# Patient Record
Sex: FEMALE | Race: OTHER | NOT HISPANIC OR LATINO | URBAN - METROPOLITAN AREA
[De-identification: names, ages, dates, MRNs, and addresses within clinical notes are randomized per-mention and may not be internally consistent; named-entity substitution may affect disease eponyms.]

---

## 2019-10-27 ENCOUNTER — EMERGENCY (EMERGENCY)
Facility: HOSPITAL | Age: 34
LOS: 1 days | Discharge: HOME | End: 2019-10-27
Attending: EMERGENCY MEDICINE
Payer: COMMERCIAL

## 2019-10-27 VITALS
WEIGHT: 179.9 LBS | RESPIRATION RATE: 22 BRPM | HEART RATE: 107 BPM | TEMPERATURE: 99 F | OXYGEN SATURATION: 97 % | DIASTOLIC BLOOD PRESSURE: 89 MMHG | SYSTOLIC BLOOD PRESSURE: 133 MMHG | HEIGHT: 65 IN

## 2019-10-27 DIAGNOSIS — Z98.89 OTHER SPECIFIED POSTPROCEDURAL STATES: Chronic | ICD-10-CM

## 2019-10-27 LAB
ALBUMIN SERPL ELPH-MCNC: 4.6 G/DL — SIGNIFICANT CHANGE UP (ref 3.3–5)
ALP SERPL-CCNC: 52 U/L — SIGNIFICANT CHANGE UP (ref 40–120)
ALT FLD-CCNC: 12 U/L — SIGNIFICANT CHANGE UP (ref 10–45)
ANION GAP SERPL CALC-SCNC: 15 MMOL/L — SIGNIFICANT CHANGE UP (ref 5–17)
APAP SERPL-MCNC: <15 UG/ML — SIGNIFICANT CHANGE UP (ref 10–30)
AST SERPL-CCNC: 17 U/L — SIGNIFICANT CHANGE UP (ref 10–40)
BASOPHILS # BLD AUTO: 0.03 K/UL — SIGNIFICANT CHANGE UP (ref 0–0.2)
BASOPHILS NFR BLD AUTO: 0.4 % — SIGNIFICANT CHANGE UP (ref 0–2)
BILIRUB SERPL-MCNC: 0.3 MG/DL — SIGNIFICANT CHANGE UP (ref 0.2–1.2)
BUN SERPL-MCNC: 16 MG/DL — SIGNIFICANT CHANGE UP (ref 7–23)
CALCIUM SERPL-MCNC: 9.6 MG/DL — SIGNIFICANT CHANGE UP (ref 8.4–10.5)
CHLORIDE SERPL-SCNC: 106 MMOL/L — SIGNIFICANT CHANGE UP (ref 96–108)
CK SERPL-CCNC: 118 U/L — SIGNIFICANT CHANGE UP (ref 25–170)
CO2 SERPL-SCNC: 20 MMOL/L — LOW (ref 22–31)
CREAT SERPL-MCNC: 1.05 MG/DL — SIGNIFICANT CHANGE UP (ref 0.5–1.3)
EOSINOPHIL # BLD AUTO: 0.06 K/UL — SIGNIFICANT CHANGE UP (ref 0–0.5)
EOSINOPHIL NFR BLD AUTO: 0.8 % — SIGNIFICANT CHANGE UP (ref 0–6)
ETHANOL SERPL-MCNC: SIGNIFICANT CHANGE UP MG/DL (ref 0–10)
GLUCOSE SERPL-MCNC: 123 MG/DL — HIGH (ref 70–99)
HCT VFR BLD CALC: 35 % — SIGNIFICANT CHANGE UP (ref 34.5–45)
HGB BLD-MCNC: 11.9 G/DL — SIGNIFICANT CHANGE UP (ref 11.5–15.5)
IMM GRANULOCYTES NFR BLD AUTO: 0.3 % — SIGNIFICANT CHANGE UP (ref 0–1.5)
LYMPHOCYTES # BLD AUTO: 2.07 K/UL — SIGNIFICANT CHANGE UP (ref 1–3.3)
LYMPHOCYTES # BLD AUTO: 28.8 % — SIGNIFICANT CHANGE UP (ref 13–44)
MCHC RBC-ENTMCNC: 28.5 PG — SIGNIFICANT CHANGE UP (ref 27–34)
MCHC RBC-ENTMCNC: 34 GM/DL — SIGNIFICANT CHANGE UP (ref 32–36)
MCV RBC AUTO: 83.7 FL — SIGNIFICANT CHANGE UP (ref 80–100)
MONOCYTES # BLD AUTO: 0.28 K/UL — SIGNIFICANT CHANGE UP (ref 0–0.9)
MONOCYTES NFR BLD AUTO: 3.9 % — SIGNIFICANT CHANGE UP (ref 2–14)
NEUTROPHILS # BLD AUTO: 4.72 K/UL — SIGNIFICANT CHANGE UP (ref 1.8–7.4)
NEUTROPHILS NFR BLD AUTO: 65.8 % — SIGNIFICANT CHANGE UP (ref 43–77)
NRBC # BLD: 0 /100 WBCS — SIGNIFICANT CHANGE UP (ref 0–0)
PLATELET # BLD AUTO: 265 K/UL — SIGNIFICANT CHANGE UP (ref 150–400)
POTASSIUM SERPL-MCNC: 3.8 MMOL/L — SIGNIFICANT CHANGE UP (ref 3.5–5.3)
POTASSIUM SERPL-SCNC: 3.8 MMOL/L — SIGNIFICANT CHANGE UP (ref 3.5–5.3)
PROT SERPL-MCNC: 7.9 G/DL — SIGNIFICANT CHANGE UP (ref 6–8.3)
RBC # BLD: 4.18 M/UL — SIGNIFICANT CHANGE UP (ref 3.8–5.2)
RBC # FLD: 12.7 % — SIGNIFICANT CHANGE UP (ref 10.3–14.5)
SALICYLATES SERPL-MCNC: <2 MG/DL — LOW (ref 15–30)
SODIUM SERPL-SCNC: 141 MMOL/L — SIGNIFICANT CHANGE UP (ref 135–145)
WBC # BLD: 7.18 K/UL — SIGNIFICANT CHANGE UP (ref 3.8–10.5)
WBC # FLD AUTO: 7.18 K/UL — SIGNIFICANT CHANGE UP (ref 3.8–10.5)

## 2019-10-27 PROCEDURE — 99284 EMERGENCY DEPT VISIT MOD MDM: CPT

## 2019-10-27 PROCEDURE — 93010 ELECTROCARDIOGRAM REPORT: CPT

## 2019-10-27 NOTE — ED PROVIDER NOTE - NSFOLLOWUPCLINICS_GEN_ALL_ED_FT
[facility]  Psychiatry  [id number] [place] [address]  Phone: [phone]  Fax:   Follow Up Time:

## 2019-10-27 NOTE — ED PROVIDER NOTE - NSFOLLOWUPINSTRUCTIONS_ED_ALL_ED_FT
Please call or see your doctor or return to the ER if you have new chest pain, shortness of breath, fevers, inability to eat or drink, or worsening of symptoms that brought you to the emergency room today.    Please follow up with your primary care physician in 1-2 days or as soon as possible.     Please use the resources provided to you and follow up with a therapist or psychiatrist; if you have thoughts of self harm please call 911 or go to an ER right away.

## 2019-10-27 NOTE — ED PROVIDER NOTE - OBJECTIVE STATEMENT
34 F ingested 10-20 pill f simvastatin 20mg tablets. Feeling stressed and anxious at home; pills are her husbands, about 30 min PTA. Had argument with  which upset her father was yelled at by father and took ~10-15 pills of 20mg simvastatin rx'd to . Denies taking anything other than what was in pill bottle denies psych hx or prior ingestions, denies SI/HI, AH/VH at this time notes she wanted to come to hospital/escape home not exactly harm herself.      LMP 1 wk ago 34 F ingested 10-20 pill f simvastatin 20mg tablets. Feeling stressed and anxious at home; pills are her husbands, about 30 min PTA. Had argument with  which upset her father was yelled at by father and took ~10-15 pills of 20mg simvastatin rx'd to . Denies taking anything other than what was in pill bottle denies psych hx or prior ingestions, denies SI/HI, AH/VH at this time notes she wanted to come to hospital/escape home not exactly harm herself.     Attn- pt seen in Crit Rm D - agree with above - pt took ~15 20 mgs tabs of simvistatin approx 30 min PTA - denies suicide intent.  no prior suicidal ideation or attempt.  denies any mental health dx.  pt is currently asymptomatic. LMP one week ago.       LMP 1 wk ago

## 2019-10-27 NOTE — ED PROVIDER NOTE - PHYSICAL EXAMINATION
Attn - pt is alert and NAD.  PERRL 3 mm.  moist mm, lungs-, cor-, abdo - soft, NT, ND, no CVAT, skin-, neuro- non focal.

## 2019-10-27 NOTE — ED PROVIDER NOTE - PATIENT PORTAL LINK FT
You can access the FollowMyHealth Patient Portal offered by [facility] by registering at the following website: [email or web address]. By joining [employer]’s FollowMyHealth portal, you will also be able to view your health information using other applications (apps) compatible with our system.

## 2019-10-27 NOTE — ED PROVIDER NOTE - PROGRESS NOTE DETAILS
Case d/w tox medically cleared pending rpt tylenol Psych aware will talk to pt in ER via telepsych pt in NAD at this time resting comfortably. Pt cleared by telepsych will give appropriate resources and dc home Pt cleared by telepsych will give resources faxed by telepsych and dc home

## 2019-10-27 NOTE — ED PROVIDER NOTE - CLINICAL SUMMARY MEDICAL DECISION MAKING FREE TEXT BOX
Attn - pt took  simvastatin 20 mgs approx 15 tabs 30 mins PTA - labs, Tox consult, Psych consult. observe

## 2019-10-27 NOTE — ED ADULT NURSE NOTE - OBJECTIVE STATEMENT
35 y/o female presents to ED from home c/o ingestion of approximately 20 pills of simvastatin (pills belonged to her ) around 20-30 minutes prior to arrival. No hx of suicidal thoughts/acitons, anxiety or depression. Pt states she did not intend to harm herself, but wanted to scare her . Pt state she has 3 children at home and feels very stressed. Has no intention to harm others.  drove pt to ED once she told him that she ingested medication. Upon arrival, pt A&O x 3. Tearful and cooperative. NSR on cardiac monitor. VSS. Lungs clear b/l. Skin warm, dry, intact. Abdomen is soft, nondistended, nontender to palpation. Patient is A&Ox4. Face is symmetrical. PERRL 3mmB. Speech is clear. Patient is moving all extremities with 5/5 strength and walks with steady gait. 20G IV placed in LAC. Safety and comfort provided.  in waiting room.

## 2019-10-28 VITALS
OXYGEN SATURATION: 98 % | DIASTOLIC BLOOD PRESSURE: 64 MMHG | RESPIRATION RATE: 18 BRPM | HEART RATE: 72 BPM | SYSTOLIC BLOOD PRESSURE: 100 MMHG

## 2019-10-28 DIAGNOSIS — F43.22 ADJUSTMENT DISORDER WITH ANXIETY: ICD-10-CM

## 2019-10-28 LAB
APAP SERPL-MCNC: <15 UG/ML — SIGNIFICANT CHANGE UP (ref 10–30)
APPEARANCE UR: CLEAR — SIGNIFICANT CHANGE UP
BILIRUB UR-MCNC: NEGATIVE — SIGNIFICANT CHANGE UP
COLOR SPEC: SIGNIFICANT CHANGE UP
DIFF PNL FLD: ABNORMAL
GLUCOSE UR QL: NEGATIVE — SIGNIFICANT CHANGE UP
HCG UR QL: NEGATIVE — SIGNIFICANT CHANGE UP
KETONES UR-MCNC: NEGATIVE — SIGNIFICANT CHANGE UP
LEUKOCYTE ESTERASE UR-ACNC: NEGATIVE — SIGNIFICANT CHANGE UP
NITRITE UR-MCNC: NEGATIVE — SIGNIFICANT CHANGE UP
PCP SPEC-MCNC: SIGNIFICANT CHANGE UP
PH UR: 6 — SIGNIFICANT CHANGE UP (ref 5–8)
PROT UR-MCNC: SIGNIFICANT CHANGE UP
SP GR SPEC: 1.02 — SIGNIFICANT CHANGE UP (ref 1.01–1.02)
UROBILINOGEN FLD QL: NEGATIVE — SIGNIFICANT CHANGE UP

## 2019-10-28 PROCEDURE — 82550 ASSAY OF CK (CPK): CPT

## 2019-10-28 PROCEDURE — 96374 THER/PROPH/DIAG INJ IV PUSH: CPT

## 2019-10-28 PROCEDURE — 81001 URINALYSIS AUTO W/SCOPE: CPT

## 2019-10-28 PROCEDURE — 81025 URINE PREGNANCY TEST: CPT

## 2019-10-28 PROCEDURE — 85027 COMPLETE CBC AUTOMATED: CPT

## 2019-10-28 PROCEDURE — 96361 HYDRATE IV INFUSION ADD-ON: CPT

## 2019-10-28 PROCEDURE — 93005 ELECTROCARDIOGRAM TRACING: CPT

## 2019-10-28 PROCEDURE — 90792 PSYCH DIAG EVAL W/MED SRVCS: CPT | Mod: GT

## 2019-10-28 PROCEDURE — 80307 DRUG TEST PRSMV CHEM ANLYZR: CPT

## 2019-10-28 PROCEDURE — 99285 EMERGENCY DEPT VISIT HI MDM: CPT | Mod: 25

## 2019-10-28 PROCEDURE — 80053 COMPREHEN METABOLIC PANEL: CPT

## 2019-10-28 RX ORDER — KETOROLAC TROMETHAMINE 30 MG/ML
15 SYRINGE (ML) INJECTION ONCE
Refills: 0 | Status: DISCONTINUED | OUTPATIENT
Start: 2019-10-28 | End: 2019-10-28

## 2019-10-28 RX ORDER — SODIUM CHLORIDE 9 MG/ML
1000 INJECTION, SOLUTION INTRAVENOUS ONCE
Refills: 0 | Status: COMPLETED | OUTPATIENT
Start: 2019-10-28 | End: 2019-10-28

## 2019-10-28 RX ADMIN — SODIUM CHLORIDE 1000 MILLILITER(S): 9 INJECTION, SOLUTION INTRAVENOUS at 04:55

## 2019-10-28 RX ADMIN — Medication 15 MILLIGRAM(S): at 04:55

## 2019-10-28 RX ADMIN — Medication 15 MILLIGRAM(S): at 04:30

## 2019-10-28 RX ADMIN — SODIUM CHLORIDE 1000 MILLILITER(S): 9 INJECTION, SOLUTION INTRAVENOUS at 05:34

## 2019-10-28 NOTE — ED ADULT NURSE REASSESSMENT NOTE - NS ED NURSE REASSESS COMMENT FT1
Pt denying headache or any other symptom at this time. VSS.  at bedside. 1:1 maintained for safety. Awaiting tele-psych consult.

## 2019-10-28 NOTE — ED BEHAVIORAL HEALTH ASSESSMENT NOTE - DESCRIPTION
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 Pre-Hospital Course: Pt was a walk-in.    ED Course: Pt was in ED ~6 hours prior to telepsychiatry consult which was requested at 1:50 and started at 4:36 Per ED RN and ED Documentation, pt ingested around 15-20 pills of simvastatin after having an argument with her  and being yelled at by Father. In ED pt states that she did not intend to hurt herself, but wanted to scare her . Pt reports that she has 3 children at home and feels stressed. Pt reported having a headache earlier but denies one currently. Pt denied intent to harm others. Pt was agreeable to hospital protocol including changing into a gown and had labs drawn. Pt had no agitation and did not require involuntary medication or security intervention. Pt was tearful in ED.  is present in ED. Speech is coherent and at normal rate and volume. Pt presents with good hygiene and grooming.

## 2019-10-28 NOTE — ED BEHAVIORAL HEALTH ASSESSMENT NOTE - HPI (INCLUDE ILLNESS QUALITY, SEVERITY, DURATION, TIMING, CONTEXT, MODIFYING FACTORS, ASSOCIATED SIGNS AND SYMPTOMS)
Patient is a 33 y/o F domiciled with spouse and children, with no medical hx and no psych hx, who presents to ED after ingesting multiple pills of her spouse's simvastatin during an argument. Patient is a 33 y/o F domiciled with spouse and children, with no medical hx and no psych hx, who presents to ED after ingesting multiple pills of her spouse's simvastatin during an argument.    patient seen by telepsychiatry. she presents as calm, linear, provides a reasonable history. She denies having a psychiatric history. she notes that she has been under a lot of stress recently, especially financial as they are trying to buy a house. She says that her 's adult son, who is living with the family and who has schizophrenia, is also very difficult to manage. She states that because of a visit from the adult son's mother, she felt upset because this woman she does not like was in her home. She called PD because she did not feel that this person should be able to enter her home without her permission. She then called PD. both her  and her father (currently visiting from [place]) were angry with her. she felt that no one listens to her and that they would be sorry if she had to go to the hospital. She says she took the pills impulsively, hoping  would recognize the problems in his style of trying to control her. she did not think the ingestion could harm her or be fatal.   Patient reports stable mood, denies depression. Denies insomnia/appetite change. Denies HI/SI/AVH/PI. Denies symptoms of linette. Reports mild anxiety surrouding stressors as above. Denies history of non-suicidal self-injury and of any suicide attempt. Denies history of violence. Denies history of trauma. Patient feels safe to return to community. Feels able to safety plan. Agrees to call 911 or return to ED for HI/SI. Patient is a 33 y/o F domiciled with spouse and children, with no medical hx and no psych hx, who presents to ED after ingesting multiple pills of her spouse's simvastatin during an argument.    patient seen by telepsychiatry. she presents as calm, linear, provides a reasonable history. She denies having a psychiatric history. she notes that she has been under a lot of stress recently, especially financial as they are trying to buy a house. She says that her 's adult son, who is living with the family and who has schizophrenia, is also very difficult to manage. She states that because of a visit from the adult son's mother, she felt upset because this woman she does not like was in her home. She called PD because she did not feel that this person should be able to enter her home without her permission. She then called PD. both her  and her father (currently visiting from [place]) were angry with her. she felt that no one listens to her and that they would be sorry if she had to go to the hospital. She says she took the pills impulsively, hoping  would recognize the problems in his style of trying to control her. she did not think the ingestion could harm her or be fatal.   Patient reports stable mood, denies depression. Denies insomnia/appetite change. Denies HI/SI/AVH/PI. Denies symptoms of linette. Reports mild anxiety surrouding stressors as above. Denies history of non-suicidal self-injury and of any suicide attempt. Denies history of violence. Denies history of trauma. Patient feels safe to return to community. Feels able to safety plan. Agrees to call 911 or return to ED for HI/SI.    Per , the HPI stars about 6 weeks ago.  and 3.5year old twins and a 5 year old; Dad here from [place] (leaving in December), ’s 21yo Son from previous marriage lives in a basement apt in same house, unemployed. 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 of home. Pt finds Gnosticist very important.  states that at baseline pt is calm, quiet and pleasant. Pt has some jealousy about ’s ex-wife, and is “very sensitive.”  Pt has no mental health hx and has never been in treatment. For the past few weeks pt has had several stressors including planning to move, putting a down payment on a house and then discovering debt that  has related to Ex-Wife, and ’s Son from previous marriage is diagnosed with Schizophrenia and is not taking medications. On Friday pt’s son Yesterday  called police about Son when he was standing outside on a fence and refusing to get down, and he was taken to the hospital. Today pt saw ’s ex-wife come to the home to see ’s Son and pt called police. Pt’s Dad and  told pt that she shouldn’t have done that. Pt tried to tell  that it wasn’t her fault that she called the police and that she was just upset, but  told her that she further complicated the situation with his Son, as he will likely be upset that the police were called on his Mother. Pt told  that she is tired and has a headache, and wanted to go to the hospital.  encouraged her to rest a little. Pt walked into the kitchen and  observed pt take his bottle of Simvastatin and start swallowing pills. Pt told  that she just did this in a stressful moment but didn’t mean to take all of these medications and did not want to harm herself. Pt asked  to not tell her Sister what happened because Sister looks up to pt.  states that pt has been taking good care of the kids, and there are no safety concerns related to the kids. Pt has no changes to appetite, ADLs, sleep, psychotic symptoms, mood, substance use or aggression/violence.  denies safety concerns, states that he believes pt did this without thinking and was not truly trying to hurt herself.  safety planned, stating that he will lock up all medications and sharps.  would like pt to return home.

## 2019-10-28 NOTE — ED ADULT NURSE REASSESSMENT NOTE - NS ED NURSE REASSESS COMMENT FT1
Care assumed of patient at this time. Patient resting at this time. Patient on constant observation.

## 2019-10-28 NOTE — ED BEHAVIORAL HEALTH ASSESSMENT NOTE - SUICIDE PROTECTIVE FACTORS
Cultural, spiritual and/or moral attitudes against suicide/Responsibility to family and others/Identifies reasons for living/Has future plans

## 2019-10-28 NOTE — ED BEHAVIORAL HEALTH ASSESSMENT NOTE - SUMMARY
Patient is a 33 y/o F domiciled with spouse and children, with no medical hx and no psych hx, who presents to ED after ingesting multiple pills of her spouse's simvastatin during an argument. Patient is a 33 y/o F domiciled with spouse and children, with no medical hx and no psych hx, who presents to ED after ingesting multiple pills of her spouse's simvastatin during an argument.    pt denies acute symptoms, describes ingestion as impulsive and not suicidal.  w/o acute safety concerns.     patient is not interested in voluntary admission. does not meet criteria for involuntary. able to safety plan, deemed appropriate for outpatient dispo. interested in resources for outpatient tx.

## 2019-10-28 NOTE — ED BEHAVIORAL HEALTH ASSESSMENT NOTE - RISK ASSESSMENT
chronic rf: poor coping skills  acute rf: multiple stressors, impulsive ingestion  pf: no current suicidal ideation, no psych hx, Gnosticism objection to suicide, reasons to live (family)

## 2019-11-01 LAB — DRUG SCREEN, SERUM: SIGNIFICANT CHANGE UP

## 2021-07-07 ENCOUNTER — TRANSCRIPTION ENCOUNTER (OUTPATIENT)
Age: 36
End: 2021-07-07
